# Patient Record
Sex: MALE | Race: BLACK OR AFRICAN AMERICAN | NOT HISPANIC OR LATINO
[De-identification: names, ages, dates, MRNs, and addresses within clinical notes are randomized per-mention and may not be internally consistent; named-entity substitution may affect disease eponyms.]

---

## 2020-01-30 PROBLEM — Z00.00 ENCOUNTER FOR PREVENTIVE HEALTH EXAMINATION: Status: ACTIVE | Noted: 2020-01-30

## 2020-02-01 ENCOUNTER — APPOINTMENT (OUTPATIENT)
Dept: NUCLEAR MEDICINE | Facility: IMAGING CENTER | Age: 80
End: 2020-02-01
Payer: MEDICARE

## 2020-02-01 ENCOUNTER — OUTPATIENT (OUTPATIENT)
Dept: OUTPATIENT SERVICES | Facility: HOSPITAL | Age: 80
LOS: 1 days | End: 2020-02-01
Payer: MEDICARE

## 2020-02-01 DIAGNOSIS — E83.52 HYPERCALCEMIA: ICD-10-CM

## 2020-02-01 PROCEDURE — A9500: CPT

## 2020-02-01 PROCEDURE — 78072 PARATHYRD PLANAR W/SPECT&CT: CPT

## 2020-02-01 PROCEDURE — 78072 PARATHYRD PLANAR W/SPECT&CT: CPT | Mod: 26

## 2020-02-01 PROCEDURE — A9512: CPT

## 2020-07-21 ENCOUNTER — APPOINTMENT (OUTPATIENT)
Dept: OTOLARYNGOLOGY | Facility: CLINIC | Age: 80
End: 2020-07-21

## 2020-08-18 ENCOUNTER — APPOINTMENT (OUTPATIENT)
Dept: OTOLARYNGOLOGY | Facility: CLINIC | Age: 80
End: 2020-08-18
Payer: MEDICARE

## 2020-08-18 VITALS — TEMPERATURE: 98.7 F | WEIGHT: 195 LBS | BODY MASS INDEX: 27.3 KG/M2 | HEIGHT: 71 IN

## 2020-08-18 VITALS — SYSTOLIC BLOOD PRESSURE: 126 MMHG | DIASTOLIC BLOOD PRESSURE: 76 MMHG | HEART RATE: 86 BPM

## 2020-08-18 DIAGNOSIS — Z72.89 OTHER PROBLEMS RELATED TO LIFESTYLE: ICD-10-CM

## 2020-08-18 DIAGNOSIS — E21.3 HYPERPARATHYROIDISM, UNSPECIFIED: ICD-10-CM

## 2020-08-18 DIAGNOSIS — Z86.79 PERSONAL HISTORY OF OTHER DISEASES OF THE CIRCULATORY SYSTEM: ICD-10-CM

## 2020-08-18 PROCEDURE — 99204 OFFICE O/P NEW MOD 45 MIN: CPT

## 2020-08-18 RX ORDER — METOPROLOL TARTRATE 75 MG/1
TABLET, FILM COATED ORAL
Refills: 0 | Status: ACTIVE | COMMUNITY

## 2020-08-18 RX ORDER — AMLODIPINE BESYLATE 5 MG/1
TABLET ORAL
Refills: 0 | Status: ACTIVE | COMMUNITY

## 2020-08-18 RX ORDER — NIFEDIPINE 20 MG/1
CAPSULE ORAL
Refills: 0 | Status: ACTIVE | COMMUNITY

## 2020-08-18 RX ORDER — LOSARTAN POTASSIUM 100 MG/1
TABLET, FILM COATED ORAL
Refills: 0 | Status: ACTIVE | COMMUNITY

## 2020-08-18 NOTE — HISTORY OF PRESENT ILLNESS
[None] : No associated symptoms are reported. [de-identified] : Mr. Tapia is a 80 yo male  who is here for eval for hyperparathyroidsm.  NAEEM Rosen (PCP) noted elevated calcium and referred to (Endocrinologist).Referred by ELIE Serrato. Pt reports possiblu knowing of this a year or two prior. \par 1/18/2020 serum calcium 10.8, PTHI 68\par 2/1/2020 NM Parathyroid Imaging showing single parathyroid lesion posterior to the upper pole of the right thyroid lobe\par 2/24/2020 24 hour urine with creatinine showing elevated UCREACAl 3.1 , UCACalc 368\par Denies any history of kidney stones. Complaints of back pain and some insomnia.  \par Feels good energy and memory.  Had repoted nl bone densiy couple years ago.  \par \par Pt reports good health otherwise. \par Denies pain, dysphagia, dysphonia and or dyspnea \par Denies recent cough, fever, chill, or changes in taste or smell

## 2020-08-18 NOTE — CONSULT LETTER
[Dear  ___] : Dear  [unfilled], [Please see my note below.] : Please see my note below. [Consult Letter:] : I had the pleasure of evaluating your patient, [unfilled]. [Sincerely,] : Sincerely, [Consult Closing:] : Thank you very much for allowing me to participate in the care of this patient.  If you have any questions, please do not hesitate to contact me. [FreeTextEntry3] : Regan Bee MD [FreeTextEntry2] : Cinthia Lam MD ([place], [place])

## 2020-08-28 ENCOUNTER — RESULT REVIEW (OUTPATIENT)
Age: 80
End: 2020-08-28

## 2020-08-28 ENCOUNTER — APPOINTMENT (OUTPATIENT)
Dept: ULTRASOUND IMAGING | Facility: CLINIC | Age: 80
End: 2020-08-28
Payer: MEDICARE

## 2020-08-28 ENCOUNTER — OUTPATIENT (OUTPATIENT)
Dept: OUTPATIENT SERVICES | Facility: HOSPITAL | Age: 80
LOS: 1 days | End: 2020-08-28
Payer: MEDICARE

## 2020-08-28 DIAGNOSIS — E21.3 HYPERPARATHYROIDISM, UNSPECIFIED: ICD-10-CM

## 2020-08-28 PROCEDURE — 76536 US EXAM OF HEAD AND NECK: CPT

## 2020-08-28 PROCEDURE — 76536 US EXAM OF HEAD AND NECK: CPT | Mod: 26

## 2020-11-06 ENCOUNTER — APPOINTMENT (OUTPATIENT)
Dept: OTOLARYNGOLOGY | Facility: HOSPITAL | Age: 80
End: 2020-11-06

## 2021-02-19 ENCOUNTER — INPATIENT (INPATIENT)
Facility: HOSPITAL | Age: 81
LOS: 1 days | Discharge: HOME | End: 2021-02-21
Attending: INTERNAL MEDICINE | Admitting: INTERNAL MEDICINE
Payer: MEDICARE

## 2021-02-19 VITALS
OXYGEN SATURATION: 98 % | RESPIRATION RATE: 18 BRPM | HEART RATE: 102 BPM | SYSTOLIC BLOOD PRESSURE: 149 MMHG | WEIGHT: 192.9 LBS | DIASTOLIC BLOOD PRESSURE: 84 MMHG | TEMPERATURE: 98 F | HEIGHT: 71 IN

## 2021-02-19 LAB
ALBUMIN SERPL ELPH-MCNC: 3.7 G/DL — SIGNIFICANT CHANGE UP (ref 3.3–5)
ALP SERPL-CCNC: 97 U/L — SIGNIFICANT CHANGE UP (ref 40–120)
ALT FLD-CCNC: 27 U/L — SIGNIFICANT CHANGE UP (ref 12–78)
ANION GAP SERPL CALC-SCNC: 8 MMOL/L — SIGNIFICANT CHANGE UP (ref 5–17)
AST SERPL-CCNC: 26 U/L — SIGNIFICANT CHANGE UP (ref 15–37)
BASOPHILS # BLD AUTO: 0.03 K/UL — SIGNIFICANT CHANGE UP (ref 0–0.2)
BASOPHILS NFR BLD AUTO: 0.7 % — SIGNIFICANT CHANGE UP (ref 0–2)
BILIRUB SERPL-MCNC: 0.5 MG/DL — SIGNIFICANT CHANGE UP (ref 0.2–1.2)
BUN SERPL-MCNC: 11 MG/DL — SIGNIFICANT CHANGE UP (ref 7–23)
CALCIUM SERPL-MCNC: 10.4 MG/DL — HIGH (ref 8.5–10.1)
CHLORIDE SERPL-SCNC: 108 MMOL/L — SIGNIFICANT CHANGE UP (ref 96–108)
CK SERPL-CCNC: 354 U/L — HIGH (ref 26–308)
CO2 SERPL-SCNC: 27 MMOL/L — SIGNIFICANT CHANGE UP (ref 22–31)
CREAT SERPL-MCNC: 1.06 MG/DL — SIGNIFICANT CHANGE UP (ref 0.5–1.3)
EOSINOPHIL # BLD AUTO: 0.07 K/UL — SIGNIFICANT CHANGE UP (ref 0–0.5)
EOSINOPHIL NFR BLD AUTO: 1.6 % — SIGNIFICANT CHANGE UP (ref 0–6)
GLUCOSE SERPL-MCNC: 136 MG/DL — HIGH (ref 70–99)
HCT VFR BLD CALC: 40.5 % — SIGNIFICANT CHANGE UP (ref 39–50)
HGB BLD-MCNC: 12.7 G/DL — LOW (ref 13–17)
IMM GRANULOCYTES NFR BLD AUTO: 0.2 % — SIGNIFICANT CHANGE UP (ref 0–1.5)
LYMPHOCYTES # BLD AUTO: 1.58 K/UL — SIGNIFICANT CHANGE UP (ref 1–3.3)
LYMPHOCYTES # BLD AUTO: 36 % — SIGNIFICANT CHANGE UP (ref 13–44)
MAGNESIUM SERPL-MCNC: 2 MG/DL — SIGNIFICANT CHANGE UP (ref 1.6–2.6)
MCHC RBC-ENTMCNC: 27.7 PG — SIGNIFICANT CHANGE UP (ref 27–34)
MCHC RBC-ENTMCNC: 31.4 GM/DL — LOW (ref 32–36)
MCV RBC AUTO: 88.2 FL — SIGNIFICANT CHANGE UP (ref 80–100)
MONOCYTES # BLD AUTO: 0.45 K/UL — SIGNIFICANT CHANGE UP (ref 0–0.9)
MONOCYTES NFR BLD AUTO: 10.3 % — SIGNIFICANT CHANGE UP (ref 2–14)
NEUTROPHILS # BLD AUTO: 2.25 K/UL — SIGNIFICANT CHANGE UP (ref 1.8–7.4)
NEUTROPHILS NFR BLD AUTO: 51.2 % — SIGNIFICANT CHANGE UP (ref 43–77)
NRBC # BLD: 0 /100 WBCS — SIGNIFICANT CHANGE UP (ref 0–0)
PHOSPHATE SERPL-MCNC: 2.5 MG/DL — SIGNIFICANT CHANGE UP (ref 2.5–4.5)
PLATELET # BLD AUTO: 201 K/UL — SIGNIFICANT CHANGE UP (ref 150–400)
POTASSIUM SERPL-MCNC: 3.7 MMOL/L — SIGNIFICANT CHANGE UP (ref 3.5–5.3)
POTASSIUM SERPL-SCNC: 3.7 MMOL/L — SIGNIFICANT CHANGE UP (ref 3.5–5.3)
PROT SERPL-MCNC: 7.9 GM/DL — SIGNIFICANT CHANGE UP (ref 6–8.3)
RBC # BLD: 4.59 M/UL — SIGNIFICANT CHANGE UP (ref 4.2–5.8)
RBC # FLD: 15.1 % — HIGH (ref 10.3–14.5)
SODIUM SERPL-SCNC: 143 MMOL/L — SIGNIFICANT CHANGE UP (ref 135–145)
TROPONIN I SERPL-MCNC: 0.12 NG/ML — HIGH (ref 0.01–0.04)
WBC # BLD: 4.39 K/UL — SIGNIFICANT CHANGE UP (ref 3.8–10.5)
WBC # FLD AUTO: 4.39 K/UL — SIGNIFICANT CHANGE UP (ref 3.8–10.5)

## 2021-02-19 PROCEDURE — 71046 X-RAY EXAM CHEST 2 VIEWS: CPT | Mod: 26

## 2021-02-19 PROCEDURE — 93010 ELECTROCARDIOGRAM REPORT: CPT

## 2021-02-19 PROCEDURE — 99285 EMERGENCY DEPT VISIT HI MDM: CPT

## 2021-02-19 RX ORDER — ASPIRIN/CALCIUM CARB/MAGNESIUM 324 MG
325 TABLET ORAL ONCE
Refills: 0 | Status: COMPLETED | OUTPATIENT
Start: 2021-02-19 | End: 2021-02-19

## 2021-02-19 RX ADMIN — Medication 325 MILLIGRAM(S): at 23:18

## 2021-02-19 NOTE — ED ADULT NURSE NOTE - ED STAT RN HANDOFF DETAILS
Report endorsed to oncoming hold RN. Safety checks compld this shift/Safety rounds completed hourly.  IV sites checked Q2+remains WDL. Meds given as ord with no s/s of adverse RXNs. Fall +skin precs in place. Any issues endorsed to oncoming RN for follow up. Report endorsed to oncoming RN for my break coverage. Report given to covering RN and patient informed during rounding Safety checks compld this shift/Safety rounds completed hourly.  Fall +skin precs in place. Any issues endorsed to oncoming RN for follow up. RN Assumed patient's care for coverage.

## 2021-02-19 NOTE — ED ADULT NURSE REASSESSMENT NOTE - NS ED NURSE REASSESS COMMENT FT1
Pt resting comfortably at this time. No s/s of pain noted. Cardiac monitor in place. Awaiting to be seen

## 2021-02-19 NOTE — ED ADULT NURSE NOTE - OBJECTIVE STATEMENT
Received patient in bed 12. AOX4. Witnessed ambulating with steady gait. AOX4. c/o chills since yesterday seen at pmd today sent to er for abnormal ekg denies chest pain. Cardiac monitor completed. EKG and labs. Awaiting to be seen

## 2021-02-19 NOTE — ED ADULT NURSE NOTE - NEURO BEHAVIOR
Principal Discharge DX:	Abnormal EEG  Assessment and plan of treatment:	- admit to neurology  - labs
calm

## 2021-02-19 NOTE — ED ADULT NURSE NOTE - NSIMPLEMENTINTERV_GEN_ALL_ED
Implemented All Fall with Harm Risk Interventions:  [place] to call system. Call bell, personal items and telephone within reach. Instruct patient to call for assistance. Room bathroom lighting operational. Non-slip footwear when patient is off stretcher. Physically safe environment: no spills, clutter or unnecessary equipment. Stretcher in lowest position, wheels locked, appropriate side rails in place. Provide visual cue, wrist band, yellow gown, etc. Monitor gait and stability. Monitor for mental status changes and reorient to person, place, and time. Review medications for side effects contributing to fall risk. Reinforce activity limits and safety measures with patient and family. Provide visual clues: red socks.

## 2021-02-19 NOTE — ED PROVIDER NOTE - OBJECTIVE STATEMENT
This patient is a 80 year old man who presents to the ER with reports of chills.  Patient states that he has been feeling shaky and having chills since yesterday.  He went to his PMD today and was told to come to the ER.  Currently in the ER he denies the symptoms.  He also denies chest pain, SOB, fever, abdominal pain and vomiting.

## 2021-02-19 NOTE — ED PROVIDER NOTE - CLINICAL SUMMARY MEDICAL DECISION MAKING FREE TEXT BOX
Patient with reports of shaking chills now resolved.  Patient well appearing no acute distress.  No tachycardia at this time no fever.  CXR negative for PNA.  EKG non-ischemic.  Troponin noted to be elevated.   Lovenox given and patient admitted to hospital/telemetry for NSTEMI.

## 2021-02-20 DIAGNOSIS — E83.52 HYPERCALCEMIA: ICD-10-CM

## 2021-02-20 DIAGNOSIS — I10 ESSENTIAL (PRIMARY) HYPERTENSION: ICD-10-CM

## 2021-02-20 DIAGNOSIS — Z98.890 OTHER SPECIFIED POSTPROCEDURAL STATES: Chronic | ICD-10-CM

## 2021-02-20 DIAGNOSIS — E78.5 HYPERLIPIDEMIA, UNSPECIFIED: ICD-10-CM

## 2021-02-20 DIAGNOSIS — Z29.9 ENCOUNTER FOR PROPHYLACTIC MEASURES, UNSPECIFIED: ICD-10-CM

## 2021-02-20 DIAGNOSIS — I21.4 NON-ST ELEVATION (NSTEMI) MYOCARDIAL INFARCTION: ICD-10-CM

## 2021-02-20 LAB
ALBUMIN SERPL ELPH-MCNC: 3.2 G/DL — LOW (ref 3.3–5)
ALP SERPL-CCNC: 86 U/L — SIGNIFICANT CHANGE UP (ref 40–120)
ALT FLD-CCNC: 26 U/L — SIGNIFICANT CHANGE UP (ref 12–78)
ANION GAP SERPL CALC-SCNC: 5 MMOL/L — SIGNIFICANT CHANGE UP (ref 5–17)
AST SERPL-CCNC: 26 U/L — SIGNIFICANT CHANGE UP (ref 15–37)
BASOPHILS # BLD AUTO: 0.03 K/UL — SIGNIFICANT CHANGE UP (ref 0–0.2)
BASOPHILS NFR BLD AUTO: 0.6 % — SIGNIFICANT CHANGE UP (ref 0–2)
BILIRUB SERPL-MCNC: 0.5 MG/DL — SIGNIFICANT CHANGE UP (ref 0.2–1.2)
BUN SERPL-MCNC: 11 MG/DL — SIGNIFICANT CHANGE UP (ref 7–23)
CALCIUM SERPL-MCNC: 9.8 MG/DL — SIGNIFICANT CHANGE UP (ref 8.5–10.1)
CHLORIDE SERPL-SCNC: 109 MMOL/L — HIGH (ref 96–108)
CHOLEST SERPL-MCNC: 123 MG/DL — SIGNIFICANT CHANGE UP
CK MB BLD-MCNC: 1.1 % — SIGNIFICANT CHANGE UP (ref 0–3.5)
CK MB CFR SERPL CALC: 3.4 NG/ML — SIGNIFICANT CHANGE UP (ref 0.5–3.6)
CK SERPL-CCNC: 321 U/L — HIGH (ref 26–308)
CO2 SERPL-SCNC: 29 MMOL/L — SIGNIFICANT CHANGE UP (ref 22–31)
CREAT SERPL-MCNC: 0.92 MG/DL — SIGNIFICANT CHANGE UP (ref 0.5–1.3)
EOSINOPHIL # BLD AUTO: 0.13 K/UL — SIGNIFICANT CHANGE UP (ref 0–0.5)
EOSINOPHIL NFR BLD AUTO: 2.7 % — SIGNIFICANT CHANGE UP (ref 0–6)
FLUAV AG NPH QL: SIGNIFICANT CHANGE UP
FLUBV AG NPH QL: SIGNIFICANT CHANGE UP
GLUCOSE SERPL-MCNC: 90 MG/DL — SIGNIFICANT CHANGE UP (ref 70–99)
HCT VFR BLD CALC: 37.5 % — LOW (ref 39–50)
HDLC SERPL-MCNC: 54 MG/DL — SIGNIFICANT CHANGE UP
HGB BLD-MCNC: 12 G/DL — LOW (ref 13–17)
IMM GRANULOCYTES NFR BLD AUTO: 0.2 % — SIGNIFICANT CHANGE UP (ref 0–1.5)
LIPID PNL WITH DIRECT LDL SERPL: 54 MG/DL — SIGNIFICANT CHANGE UP
LYMPHOCYTES # BLD AUTO: 2.59 K/UL — SIGNIFICANT CHANGE UP (ref 1–3.3)
LYMPHOCYTES # BLD AUTO: 53.8 % — HIGH (ref 13–44)
MAGNESIUM SERPL-MCNC: 2 MG/DL — SIGNIFICANT CHANGE UP (ref 1.6–2.6)
MCHC RBC-ENTMCNC: 27.7 PG — SIGNIFICANT CHANGE UP (ref 27–34)
MCHC RBC-ENTMCNC: 32 GM/DL — SIGNIFICANT CHANGE UP (ref 32–36)
MCV RBC AUTO: 86.6 FL — SIGNIFICANT CHANGE UP (ref 80–100)
MONOCYTES # BLD AUTO: 0.57 K/UL — SIGNIFICANT CHANGE UP (ref 0–0.9)
MONOCYTES NFR BLD AUTO: 11.9 % — SIGNIFICANT CHANGE UP (ref 2–14)
NEUTROPHILS # BLD AUTO: 1.48 K/UL — LOW (ref 1.8–7.4)
NEUTROPHILS NFR BLD AUTO: 30.8 % — LOW (ref 43–77)
NON HDL CHOLESTEROL: 68 MG/DL — SIGNIFICANT CHANGE UP
NRBC # BLD: 0 /100 WBCS — SIGNIFICANT CHANGE UP (ref 0–0)
PHOSPHATE SERPL-MCNC: 2.9 MG/DL — SIGNIFICANT CHANGE UP (ref 2.5–4.5)
PLATELET # BLD AUTO: 182 K/UL — SIGNIFICANT CHANGE UP (ref 150–400)
POTASSIUM SERPL-MCNC: 4 MMOL/L — SIGNIFICANT CHANGE UP (ref 3.5–5.3)
POTASSIUM SERPL-SCNC: 4 MMOL/L — SIGNIFICANT CHANGE UP (ref 3.5–5.3)
PROT SERPL-MCNC: 7 GM/DL — SIGNIFICANT CHANGE UP (ref 6–8.3)
RBC # BLD: 4.33 M/UL — SIGNIFICANT CHANGE UP (ref 4.2–5.8)
RBC # FLD: 15.1 % — HIGH (ref 10.3–14.5)
SARS-COV-2 IGG SERPL QL IA: NEGATIVE — SIGNIFICANT CHANGE UP
SARS-COV-2 IGM SERPL IA-ACNC: 0.07 INDEX — SIGNIFICANT CHANGE UP
SARS-COV-2 RNA SPEC QL NAA+PROBE: SIGNIFICANT CHANGE UP
SODIUM SERPL-SCNC: 143 MMOL/L — SIGNIFICANT CHANGE UP (ref 135–145)
TRIGL SERPL-MCNC: 75 MG/DL — SIGNIFICANT CHANGE UP
TROPONIN I SERPL-MCNC: 0.12 NG/ML — HIGH (ref 0.01–0.04)
TROPONIN I SERPL-MCNC: 0.13 NG/ML — HIGH (ref 0.01–0.04)
WBC # BLD: 4.81 K/UL — SIGNIFICANT CHANGE UP (ref 3.8–10.5)
WBC # FLD AUTO: 4.81 K/UL — SIGNIFICANT CHANGE UP (ref 3.8–10.5)

## 2021-02-20 PROCEDURE — 12345: CPT | Mod: NC

## 2021-02-20 PROCEDURE — 99223 1ST HOSP IP/OBS HIGH 75: CPT

## 2021-02-20 PROCEDURE — 93306 TTE W/DOPPLER COMPLETE: CPT | Mod: 26

## 2021-02-20 PROCEDURE — 99222 1ST HOSP IP/OBS MODERATE 55: CPT

## 2021-02-20 RX ORDER — NIFEDIPINE 30 MG
30 TABLET, EXTENDED RELEASE 24 HR ORAL DAILY
Refills: 0 | Status: DISCONTINUED | OUTPATIENT
Start: 2021-02-20 | End: 2021-02-21

## 2021-02-20 RX ORDER — SODIUM CHLORIDE 9 MG/ML
1000 INJECTION INTRAMUSCULAR; INTRAVENOUS; SUBCUTANEOUS
Refills: 0 | Status: COMPLETED | OUTPATIENT
Start: 2021-02-20 | End: 2021-02-20

## 2021-02-20 RX ORDER — ONDANSETRON 8 MG/1
4 TABLET, FILM COATED ORAL EVERY 6 HOURS
Refills: 0 | Status: DISCONTINUED | OUTPATIENT
Start: 2021-02-20 | End: 2021-02-21

## 2021-02-20 RX ORDER — NIFEDIPINE 30 MG
0 TABLET, EXTENDED RELEASE 24 HR ORAL
Qty: 0 | Refills: 0 | DISCHARGE

## 2021-02-20 RX ORDER — LOSARTAN POTASSIUM 100 MG/1
0 TABLET, FILM COATED ORAL
Qty: 0 | Refills: 0 | DISCHARGE

## 2021-02-20 RX ORDER — ASPIRIN/CALCIUM CARB/MAGNESIUM 324 MG
81 TABLET ORAL DAILY
Refills: 0 | Status: DISCONTINUED | OUTPATIENT
Start: 2021-02-20 | End: 2021-02-21

## 2021-02-20 RX ORDER — ACETAMINOPHEN 500 MG
650 TABLET ORAL EVERY 6 HOURS
Refills: 0 | Status: DISCONTINUED | OUTPATIENT
Start: 2021-02-20 | End: 2021-02-21

## 2021-02-20 RX ORDER — ENOXAPARIN SODIUM 100 MG/ML
80 INJECTION SUBCUTANEOUS ONCE
Refills: 0 | Status: COMPLETED | OUTPATIENT
Start: 2021-02-20 | End: 2021-02-20

## 2021-02-20 RX ORDER — LOVASTATIN 20 MG
0 TABLET ORAL
Qty: 0 | Refills: 0 | DISCHARGE

## 2021-02-20 RX ORDER — ATORVASTATIN CALCIUM 80 MG/1
80 TABLET, FILM COATED ORAL AT BEDTIME
Refills: 0 | Status: DISCONTINUED | OUTPATIENT
Start: 2021-02-20 | End: 2021-02-21

## 2021-02-20 RX ORDER — LOSARTAN POTASSIUM 100 MG/1
25 TABLET, FILM COATED ORAL DAILY
Refills: 0 | Status: DISCONTINUED | OUTPATIENT
Start: 2021-02-20 | End: 2021-02-21

## 2021-02-20 RX ORDER — METOPROLOL TARTRATE 50 MG
0 TABLET ORAL
Qty: 0 | Refills: 0 | DISCHARGE

## 2021-02-20 RX ORDER — ENOXAPARIN SODIUM 100 MG/ML
80 INJECTION SUBCUTANEOUS EVERY 12 HOURS
Refills: 0 | Status: DISCONTINUED | OUTPATIENT
Start: 2021-02-20 | End: 2021-02-21

## 2021-02-20 RX ADMIN — Medication 81 MILLIGRAM(S): at 11:45

## 2021-02-20 RX ADMIN — Medication 30 MILLIGRAM(S): at 04:13

## 2021-02-20 RX ADMIN — ATORVASTATIN CALCIUM 80 MILLIGRAM(S): 80 TABLET, FILM COATED ORAL at 22:02

## 2021-02-20 RX ADMIN — ENOXAPARIN SODIUM 80 MILLIGRAM(S): 100 INJECTION SUBCUTANEOUS at 00:51

## 2021-02-20 RX ADMIN — ENOXAPARIN SODIUM 80 MILLIGRAM(S): 100 INJECTION SUBCUTANEOUS at 17:10

## 2021-02-20 RX ADMIN — LOSARTAN POTASSIUM 25 MILLIGRAM(S): 100 TABLET, FILM COATED ORAL at 04:13

## 2021-02-20 RX ADMIN — SODIUM CHLORIDE 75 MILLILITER(S): 9 INJECTION INTRAMUSCULAR; INTRAVENOUS; SUBCUTANEOUS at 07:01

## 2021-02-20 NOTE — H&P ADULT - PROBLEM SELECTOR PLAN 2
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 Pt reports surgery was initially recommended when first diagnosed in Feb of last yr however procedure was delayed due to COVID. Pt reports recent follow up lab work showed Ca levels are decreasing on own and PCP stated he may not need surgery  - mildly elevated today, will place on gentle IVF o/n, f/u rpt in am  - Outpt f/u w/ PCP/Endocrinology
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
## 2021-02-20 NOTE — H&P ADULT - NSICDXFAMILYHX_GEN_ALL_CORE_FT
FAMILY HISTORY:  Family history unknown, Both father and sister passed away in [place], pt unsure why

## 2021-02-20 NOTE — H&P ADULT - PROBLEM SELECTOR PLAN 1
- c/w tele monitoring  - trend trops  - given chills, could possibly be type II related some underlying/unknown viral illness  - pt s/p 1 full dose of ASA and Lovenox. Cont daily ASA, A/C pending Cardio eval in am (will call service in am)  - keep NPO for now  - start high dose statin  - f/u lipid panel in am  - 2D Echo in am  - Pt believes he may have had a recent NM stress test w/in the year which was normal; states info w/ PCP Dr James ([facility]) consider calling in am to get records - c/w tele monitoring  - trend trops  - given chills, could possibly be type II related to some underlying/unknown viral illness  - pt s/p 1 full dose of ASA and Lovenox. Cont daily ASA, A/C pending Cardio eval in am; service made aware, Dr John on call  - keep NPO for now  - start high dose statin  - f/u lipid panel in am  - 2D Echo in am  - Pt believes he may have had a recent NM stress test w/in the year which was normal; states info w/ PCP Dr James ([facility]) consider calling in am to get records
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
## 2021-02-20 NOTE — H&P ADULT - NSHPPHYSICALEXAM_GEN_ALL_CORE
PHYSICAL EXAM:    Vital Signs Last 24 Hrs  T(C): 36.8 (19 Feb 2021 23:15), Max: 36.8 (19 Feb 2021 20:33)  T(F): 98.2 (19 Feb 2021 23:15), Max: 98.3 (19 Feb 2021 20:33)  HR: 70 (20 Feb 2021 00:00) (70 - 102)  BP: 117/73 (20 Feb 2021 00:00) (104/74 - 149/84)  BP(mean): --  RR: 19 (20 Feb 2021 00:00) (14 - 19)  SpO2: 97% (20 Feb 2021 00:00) (94% - 98%)    GENERAL: Pt lying in bed comfortably in NAD  HEENT:  Atraumatic, EOMI, PERRL, conjunctiva and sclera clear, MMM  NECK: Supple, No JVD  CHEST/LUNG: Clear to auscultation bilaterally; No rales, rhonchi, wheezing or rubs. Unlabored respirations  HEART: Regular rate and rhythm; No murmurs, rubs, or gallops  ABDOMEN: Bowel sounds present; Soft, Nontender, Nondistended. No guarding or rigidity    EXTREMITIES:  2+ Peripheral Pulses, brisk capillary refill. No clubbing, cyanosis, or edema  NEUROLOGICAL:  Alert & Oriented X3, speech clear. Answers questions appropriately. Full and equal strength B/L upper and lower extremities. No deficits   MSK: FROM x 4 extremities   SKIN: No rashes or lesions

## 2021-02-20 NOTE — H&P ADULT - NSHPLABSRESULTS_GEN_ALL_CORE
T(C): 36.8 (02-19-21 @ 23:15), Max: 36.8 (02-19-21 @ 20:33)  HR: 70 (02-20-21 @ 00:00) (70 - 102)  BP: 117/73 (02-20-21 @ 00:00) (104/74 - 149/84)  RR: 19 (02-20-21 @ 00:00) (14 - 19)  SpO2: 97% (02-20-21 @ 00:00) (94% - 98%)                        12.7   4.39  )-----------( 201      ( 19 Feb 2021 22:00 )             40.5     02-19    143  |  108  |  11  ----------------------------<  136<H>  3.7   |  27  |  1.06    Ca    10.4<H>      19 Feb 2021 22:00  Phos  2.5     02-19  Mg     2.0     02-19    TPro  7.9  /  Alb  3.7  /  TBili  0.5  /  DBili  x   /  AST  26  /  ALT  27  /  AlkPhos  97  02-19    LIVER FUNCTIONS - ( 19 Feb 2021 22:00 )  Alb: 3.7 g/dL / Pro: 7.9 gm/dL / ALK PHOS: 97 U/L / ALT: 27 U/L / AST: 26 U/L / GGT: x                   acetaminophen   Tablet .. 650 milliGRAM(s) Oral every 6 hours PRN  ondansetron Injectable 4 milliGRAM(s) IV Push every 6 hours PRN T(C): 36.8 (02-19-21 @ 23:15), Max: 36.8 (02-19-21 @ 20:33)  HR: 70 (02-20-21 @ 00:00) (70 - 102)  BP: 117/73 (02-20-21 @ 00:00) (104/74 - 149/84)  RR: 19 (02-20-21 @ 00:00) (14 - 19)  SpO2: 97% (02-20-21 @ 00:00) (94% - 98%)                        12.7   4.39  )-----------( 201      ( 19 Feb 2021 22:00 )             40.5     02-19    143  |  108  |  11  ----------------------------<  136<H>  3.7   |  27  |  1.06    Ca    10.4<H>      19 Feb 2021 22:00  Phos  2.5     02-19  Mg     2.0     02-19    TPro  7.9  /  Alb  3.7  /  TBili  0.5  /  DBili  x   /  AST  26  /  ALT  27  /  AlkPhos  97  02-19    LIVER FUNCTIONS - ( 19 Feb 2021 22:00 )  Alb: 3.7 g/dL / Pro: 7.9 gm/dL / ALK PHOS: 97 U/L / ALT: 27 U/L / AST: 26 U/L / GGT: x             EKG - NSR at 92 bpm, T wave inv in aVL, nonspecific flattening in lateral leads      acetaminophen   Tablet .. 650 milliGRAM(s) Oral every 6 hours PRN  ondansetron Injectable 4 milliGRAM(s) IV Push every 6 hours PRN

## 2021-02-20 NOTE — CONSULT NOTE ADULT - ASSESSMENT
The chart has been reviewed but the patient has not yet been examined.  Full note to follow. 79 yo male admitted with shaking chills and tachycardia.  Mildly elevated troponins, unchanged over course of 24 hours, and non specific ST T wave changes on ecg; unlikely due to acute coronary insufficiency.  He is otherwise afebrile and no leukocytosis noted.  If asymptomatic, can discharge in AM from CV perspective for out patient risk stratification.

## 2021-02-20 NOTE — H&P ADULT - ASSESSMENT
81 y/o male w/ PMHx of HTN, HLD, Chronic Lower back pain secondary to Scoliosis sent in from PCP's office for chills, tachycardia r/o sepsis. Pt being admitted for NSTEMI.    IMPROVE VTE Individual Risk Assessment    RISK                                                                Points    [  ] Previous VTE                                                  3    [  ] Thrombophilia                                               2    [  ] Lower limb paralysis                                      2        (unable to hold up >15 seconds)      [  ] Current Cancer                                              2         (within 6 months)    [  ] Immobilization > 24 hrs                                1    [  ] ICU/CCU stay > 24 hours                              1    [  ] Age > 60                                                      1    IMPROVE VTE Score _________    IMPROVE Score 0-1: Low Risk, No VTE prophylaxis required for most patients, encourage ambulation.   IMPROVE Score 2-3: At risk, pharmacologic VTE prophylaxis is indicated for most patients (in the absence of a contraindication)  IMPROVE Score > or = 4: High Risk, pharmacologic VTE prophylaxis is indicated for most patients (in the absence of a contraindication) 81 y/o male w/ PMHx of HTN, HLD, Chronic Lower back pain secondary to Scoliosis, Hypercalcemia secondary to Parathyroid Adenoma sent in from PCP's office for chills, tachycardia r/o sepsis. Pt being admitted for NSTEMI.    IMPROVE VTE Individual Risk Assessment    RISK                                                                Points    [  ] Previous VTE                                                  3    [  ] Thrombophilia                                               2    [  ] Lower limb paralysis                                      2        (unable to hold up >15 seconds)      [  ] Current Cancer                                              2         (within 6 months)    [  ] Immobilization > 24 hrs                                1    [  ] ICU/CCU stay > 24 hours                              1    [  ] Age > 60                                                      1    IMPROVE VTE Score _________    IMPROVE Score 0-1: Low Risk, No VTE prophylaxis required for most patients, encourage ambulation.   IMPROVE Score 2-3: At risk, pharmacologic VTE prophylaxis is indicated for most patients (in the absence of a contraindication)  IMPROVE Score > or = 4: High Risk, pharmacologic VTE prophylaxis is indicated for most patients (in the absence of a contraindication)
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
## 2021-02-21 ENCOUNTER — TRANSCRIPTION ENCOUNTER (OUTPATIENT)
Age: 81
End: 2021-02-21

## 2021-02-21 VITALS
TEMPERATURE: 98 F | RESPIRATION RATE: 18 BRPM | DIASTOLIC BLOOD PRESSURE: 82 MMHG | OXYGEN SATURATION: 97 % | HEART RATE: 62 BPM | SYSTOLIC BLOOD PRESSURE: 134 MMHG

## 2021-02-21 PROCEDURE — 99239 HOSP IP/OBS DSCHRG MGMT >30: CPT

## 2021-02-21 PROCEDURE — 99233 SBSQ HOSP IP/OBS HIGH 50: CPT

## 2021-02-21 RX ADMIN — Medication 30 MILLIGRAM(S): at 11:24

## 2021-02-21 RX ADMIN — ENOXAPARIN SODIUM 80 MILLIGRAM(S): 100 INJECTION SUBCUTANEOUS at 05:57

## 2021-02-21 RX ADMIN — Medication 81 MILLIGRAM(S): at 11:23

## 2021-02-21 RX ADMIN — LOSARTAN POTASSIUM 25 MILLIGRAM(S): 100 TABLET, FILM COATED ORAL at 05:57

## 2021-02-21 NOTE — DISCHARGE NOTE PROVIDER - NSDCMRMEDTOKEN_GEN_ALL_CORE_FT
LOSARTAN POTASSIUM 25 MG TAB: TAKE 1 TABLET (25 MG TOTAL) BY MOUTH DAILY MAX DAILY AMOUNT  25 MG  LOVASTATIN 10 MG TABLET: TAKE 1 TABLET BY MOUTH EVERY DAY IN THE EVENING  NIFEDIPINE ER 30 MG TABLET: TAKE 1 TABLET (30 MG TOTAL) BY MOUTH DAILY MAX DAILY AMOUNT  30 MG
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
## 2021-02-21 NOTE — DISCHARGE NOTE PROVIDER - CARE PROVIDER_API CALL
Kg John)  Cardiology; Cardiovascular Disease; Nuclear Cardiology  [address] [place] [address]  Phone: [phone]  Fax: [phone]  Follow Up Time:     pcp,   Phone: (   )    -  Fax: (   )    -  Follow Up Time:

## 2021-02-21 NOTE — DISCHARGE NOTE PROVIDER - NSDCCPCAREPLAN_GEN_ALL_CORE_FT
PRINCIPAL DISCHARGE DIAGNOSIS  Diagnosis: NSTEMI (non-ST elevated myocardial infarction)  Assessment and Plan of Treatment: ruled out  Chest pain Resolved  - Unlikely ACS  - D echo :   1. Left ventricular ejection fraction, by visual estimation, is 50 to 55%.   2. Trace mitral valve regurgitation.   3. Mild tricuspid regurgitation.   4. Estimated pulmonary artery systolic pressure is 36.9 mmHg assuming a right atrial pressure of 8 mmHg, which is consistent with borderline pulmonary hypertension.  Seen by cardio  Stable for DC   Outpt stress test
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
## 2021-02-21 NOTE — DISCHARGE NOTE NURSING/CASE MANAGEMENT/SOCIAL WORK - PATIENT PORTAL LINK FT
You can access the FollowMyHealth Patient Portal offered by [facility] by registering at the following website: [email or web address]. By joining [facility]’s FollowMyHealth portal, you will also be able to view your health information using other applications (apps) compatible with our system.

## 2021-02-21 NOTE — PROGRESS NOTE ADULT - ASSESSMENT
79 yo male admitted with shaking chills and tachycardia. Both resolved.  Mildly elevated troponins, unchanged over course of 24 hours, and non specific ST T wave changes on ecg; unlikely due to acute coronary insufficiency.  He is otherwise afebrile and no leukocytosis noted.  Can discharge in AM from [facility] perspective for out patient risk stratification @PCP.

## 2021-02-26 DIAGNOSIS — R07.9 CHEST PAIN, UNSPECIFIED: ICD-10-CM

## 2021-02-26 DIAGNOSIS — I08.1 RHEUMATIC DISORDERS OF BOTH MITRAL AND TRICUSPID VALVES: ICD-10-CM

## 2021-02-26 DIAGNOSIS — R68.83 CHILLS (WITHOUT FEVER): ICD-10-CM

## 2021-02-26 DIAGNOSIS — M41.9 SCOLIOSIS, UNSPECIFIED: ICD-10-CM

## 2021-02-26 DIAGNOSIS — E83.52 HYPERCALCEMIA: ICD-10-CM

## 2021-02-26 DIAGNOSIS — R00.0 TACHYCARDIA, UNSPECIFIED: ICD-10-CM

## 2021-02-26 DIAGNOSIS — I27.20 PULMONARY HYPERTENSION, UNSPECIFIED: ICD-10-CM

## 2021-02-26 DIAGNOSIS — D34 BENIGN NEOPLASM OF THYROID GLAND: ICD-10-CM

## 2021-02-26 DIAGNOSIS — I21.4 NON-ST ELEVATION (NSTEMI) MYOCARDIAL INFARCTION: ICD-10-CM

## 2022-03-27 PROBLEM — I10 ESSENTIAL (PRIMARY) HYPERTENSION: Chronic | Status: ACTIVE | Noted: 2021-02-20

## 2022-03-27 PROBLEM — E78.5 HYPERLIPIDEMIA, UNSPECIFIED: Chronic | Status: ACTIVE | Noted: 2021-02-20

## 2022-04-07 ENCOUNTER — APPOINTMENT (OUTPATIENT)
Dept: ORTHOPEDIC SURGERY | Facility: CLINIC | Age: 82
End: 2022-04-07
Payer: MEDICARE

## 2022-04-07 VITALS — HEIGHT: 71 IN | BODY MASS INDEX: 27.3 KG/M2 | WEIGHT: 195 LBS

## 2022-04-07 PROCEDURE — 99212 OFFICE O/P EST SF 10 MIN: CPT | Mod: 25

## 2022-04-07 NOTE — HISTORY OF PRESENT ILLNESS
[Left Leg] : left leg [Right Leg] : right leg [] : This patient has had an injection before: yes [4] : 4 [Orthovisc] : Orthovisc [de-identified] : Low back, L shoulder, B/L knee pain\par   3/3/22: MRI review. Shoulder with slight improvement.\par   3/17/22: Patient here today for orthovisc #1 both knees\par  3/24/22: Orthovisc #2\par  3/31/22: Orthovisc #3 bilateral knees.\par 4/7/22: Orthovisc #4 bilateral knees. [FreeTextEntry1] : Knees [de-identified] : 03.31.22  knees [TWNoteComboBox1] : 50%

## 2022-04-07 NOTE — DISCUSSION/SUMMARY
[de-identified] : The risks, benefits, contents and alternatives to injection were explained in full to the patient.  Risks outlined include but are not limited to infection, sepsis, bleeding, scarring, skin discoloration, temporary increase in pain, syncopal episode, failure to resolve symptoms, allergic reaction, flare reaction, permanent white skin discoloration, symptom recurrence, and elevation of blood sugar in diabetics.  Patient understood the risks.  All questions were answered.  After discussion of options, patient requested an injection.  Oral informed consent was obtained and sterile prep was done of the injection site.  Sterile technique was used to introduce the mixture.  Patient tolerated the procedure well.  Patient advised to ice the injection site this evening.  Signs and symptoms of infection reviewed and patient advised to call immediately for redness, fevers, and/or chills.\par \par Progress note completed by Suzi Barreto PA-C.

## 2022-04-07 NOTE — ASSESSMENT
[FreeTextEntry1] : FINDINGS/IMPRESSION: L-SPINE XRAY LHR\par There is right lateral listhesis of L4 on L5 by 2.0 cm, unchanged on the neutral view and right bending view. \par Please see official report of the scoliosis series of 2/7/22 for additional findings. \par JOSH REDMOND CHI, MD - Electronically Signed: 02-\par I REVIEWED AND AGREE WITH THE RESULTS. \par \par IMPRESSION: SCOLIOSIS STUDY. \par 1. Levoscoliosis of the thoracic spine measures 23 degrees and dextroscoliosis of the lumbar spine 43 degrees.\par 2. Transitional lumbosacral vertebra partially lumbarized S1.\par 3. Compression fracture deformity of L5 and S1. \par 4. Multilevel disc space narrowing of the lumbar spine. \par Miguel A Amaral MD - Electronically Signed: 02-\par I REVIEWED AND AGREE WITH THE RESULTS\par \par 2/17/22 xray left shoulder reveals AC joint arthritic changes. . \par Xray of the right knee reveals mod/adv tricompartment OA\par xray of the left knee reveals mod/adv tricompartment OA \par Obtain mri LEFT SHOULDER r/o RCT. \par \par Obtain auth for gel injection for the treatment of OA B/L knees. \par Referred to spine specialist for further care and treatment.\par \par Impression: left shoulder\par 1. 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 Lateral downsloping acromion which may be causing impingement.\par 2. High-grade partial tear at the insertion and critical zone with severe tendinopathy and fraying of the supraspinatus tendon. Low-grade partial tear at the insertion with severe tendinopathy and fraying of the infraspinatus tendon.\par 3. Longitudinal interstitial tear, tendinopathy, and tenosynovitis of the long head of the biceps tendon. The intra-articular portion is not well-visualized, likely torn.\par 4. Thickening/tendinopathy of the subscapularis tendon.\par 5. Tear of the posterior, superior, and anterior labrum.\par 6. Small joint effusion.\par E-signed by David Kramer M.D. on 02/26/2022\par I reviewed and agree with the results.\par \par Pathology discussed, wear and tear type changes. not indicated for surgical intervention. MRI reveals global inflammation with partial tear, and AC joint OA. \par He reports mild improvement in the shoulder, still having back and knee pain, \par Gel injections still pending, and intends to see Dr. Felix in the following days\par \par 3/31/22: He tolerated the injections well. He is advised rest and ice today. Activities as tolerated tomorrow. f/u in 1 week to continue series.\par \par 4/7/22: Orthovisc #4 B/L knees today, tolerated well

## 2022-04-07 NOTE — PROCEDURE
[Large Joint Injection] : Large joint injection [Bilateral] : bilaterally of the [Knee] : knee [Pain] : pain [Inflammation] : inflammation [X-ray evidence of Osteoarthritis on this or prior visit] : x-ray evidence of Osteoarthritis on this or prior visit [Alcohol] : alcohol [Betadine] : betadine [Ethyl Chloride sprayed topically] : ethyl chloride sprayed topically [Sterile technique used] : sterile technique used [Orthovisc] : Orthovisc [#4] : series #4 [Call if redness, pain or fever occur] : call if redness, pain or fever occur [Apply ice for 15min out of every hour for the next 12-24 hours as tolerated] : apply ice for 15 minutes out of every hour for the next 12-24 hours as tolerated [Patient was advised to rest the joint(s) for ____ days] : patient was advised to rest the joint(s) for [unfilled] days [Previous OTC use and PT nontherapeutic] : patient has tried OTC's including aspirin, Ibuprofen, Aleve, etc or prescription NSAIDS, and/or exercises at home and/or physical therapy without satisfactory response [Patient had decreased mobility in the joint] : patient had decreased mobility in the joint [Risks, benefits, alternatives discussed / Verbal consent obtained] : the risks benefits, and alternatives have been discussed, and verbal consent was obtained [de-identified] : NDC [address]-98 Exp: 2/29/24

## 2022-04-25 ENCOUNTER — APPOINTMENT (OUTPATIENT)
Dept: ORTHOPEDIC SURGERY | Facility: CLINIC | Age: 82
End: 2022-04-25

## 2022-06-07 ENCOUNTER — APPOINTMENT (OUTPATIENT)
Dept: ORTHOPEDIC SURGERY | Facility: CLINIC | Age: 82
End: 2022-06-07
Payer: MEDICARE

## 2022-06-07 PROCEDURE — L1833: CPT

## 2022-06-07 PROCEDURE — 99213 OFFICE O/P EST LOW 20 MIN: CPT | Mod: 25

## 2022-06-07 PROCEDURE — 20610 DRAIN/INJ JOINT/BURSA W/O US: CPT | Mod: 50

## 2022-06-07 PROCEDURE — J3490M: CUSTOM

## 2022-06-07 NOTE — REASON FOR VISIT
[FreeTextEntry2] : Follow up with Bl knee pain, pain is getting worse on his left knee more than rt knee

## 2022-06-07 NOTE — HISTORY OF PRESENT ILLNESS
[Left Leg] : left leg [Right Leg] : right leg [] : This patient has had an injection before: yes [Orthovisc] : Orthovisc [Result of repetitive motion] : result of repetitive motion [8] : 8 [4] : 4 [Dull/Aching] : dull/aching [FreeTextEntry1] : Knees [de-identified] : 04/7/22  knees [de-identified] : bl knees [TWNoteComboBox1] : 50% [de-identified] : Low back, L shoulder, B/L knee pain\par   3/3/22: MRI review. Shoulder with slight improvement.\par   3/17/22: Patient here today for orthovisc #1 both knees\par  3/24/22: Orthovisc #2\par  3/31/22: Orthovisc #3 bilateral knees.\par 4/7/22: Orthovisc #4 bilateral knees.

## 2022-06-07 NOTE — PHYSICAL EXAM
[Orientated] : orientated [Well Developed] : well developed [Right] : right knee [Left] : left knee [5___] : hamstring 5[unfilled]/5 [Positive] : positive Tono [] : negative Lachmann [TWNoteComboBox7] : flexion 110 degrees [de-identified] : extension 7 degrees

## 2022-06-07 NOTE — DISCUSSION/SUMMARY
[de-identified] : reviewed his xrays from feb both knees. Min OA.\par since he continues with buckling and falling complaints worse on the left, will get him in a hinged knee brace for ambulation and get an mri. If meniscal pathology seems to be the cause of his instability would consider arthroscopic intervention.\par \par will administer csi b/l today for pain

## 2022-06-07 NOTE — PROCEDURE
[Large Joint Injection] : Large joint injection [Bilateral] : bilaterally of the [Knee] : knee [Pain] : pain [Inflammation] : inflammation [X-ray evidence of Osteoarthritis on this or prior visit] : x-ray evidence of Osteoarthritis on this or prior visit [Alcohol] : alcohol [Betadine] : betadine [Ethyl Chloride sprayed topically] : ethyl chloride sprayed topically [Sterile technique used] : sterile technique used [___ cc    6mg] :  Betamethasone (Celestone) ~Vcc of 6mg [___ cc    1%] : Lidocaine ~Vcc of 1%  [] : Patient tolerated procedure well [Call if redness, pain or fever occur] : call if redness, pain or fever occur [Apply ice for 15min out of every hour for the next 12-24 hours as tolerated] : apply ice for 15 minutes out of every hour for the next 12-24 hours as tolerated [Patient was advised to rest the joint(s) for ____ days] : patient was advised to rest the joint(s) for [unfilled] days [Previous OTC use and PT nontherapeutic] : patient has tried OTC's including aspirin, Ibuprofen, Aleve, etc or prescription NSAIDS, and/or exercises at home and/or physical therapy without satisfactory response [Patient had decreased mobility in the joint] : patient had decreased mobility in the joint [Risks, benefits, alternatives discussed / Verbal consent obtained] : the risks benefits, and alternatives have been discussed, and verbal consent was obtained

## 2022-06-09 ENCOUNTER — FORM ENCOUNTER (OUTPATIENT)
Age: 82
End: 2022-06-09

## 2022-06-10 ENCOUNTER — APPOINTMENT (OUTPATIENT)
Dept: MRI IMAGING | Facility: CLINIC | Age: 82
End: 2022-06-10
Payer: MEDICARE

## 2022-06-10 PROCEDURE — 73721 MRI JNT OF LWR EXTRE W/O DYE: CPT | Mod: LT

## 2022-07-12 ENCOUNTER — APPOINTMENT (OUTPATIENT)
Dept: ORTHOPEDIC SURGERY | Facility: CLINIC | Age: 82
End: 2022-07-12

## 2022-07-12 DIAGNOSIS — M89.9 DISORDER OF BONE, UNSPECIFIED: ICD-10-CM

## 2022-07-12 DIAGNOSIS — M41.9 SCOLIOSIS, UNSPECIFIED: ICD-10-CM

## 2022-07-12 PROCEDURE — 99214 OFFICE O/P EST MOD 30 MIN: CPT

## 2022-07-12 NOTE — IMAGING
[de-identified] : Back, including spine: Inspection of the thoracic/lumbar spine is as follows: scoliosis Palpation of the\par thoracic/lumbar spine is as follows: left SI joint tenderness and right trochanteric bursa tenderness to\par palpation. Range of motion of the thoracic and lumbar spine is as follows: Diminished range of motion in all\par planes. pain at extremes extension, pain with bending to the right and pain with bending to the left. Strength\par Testing of the thoracic and lumbar spine is as follows: motor exam is 5/5 throughout both lower extremities with normal\par tone and motor exam is non-focal throughout both lower extremities Special testing of the thoracic and lumbar spine is\par as follows: negative sitting straight leg raise on right and negative sitting straight leg raise on left Neurological testing\par of the thoracic and lumbar spine is as follows: light touch is intact throughout both lower extremities Gait and function\par is as follows: patient ambulates without assistive device and antalgic gait.

## 2022-07-12 NOTE — ASSESSMENT
[FreeTextEntry1] : Lesion noted within L3 vertebral body with surrounding STIR signal\par MRI w/ and w/o contrast to better characterize lesion

## 2022-07-12 NOTE — HISTORY OF PRESENT ILLNESS
[10] : 10 [de-identified] : LHR scoli and lumbar XRs\par Impression: top of the T7 and the bottom of the T11, this angle measures 23 degrees. Measuring from top of the T12\par and the bottom of the L4, this angle measures 43 degrees. \par There is no spondylolisthesis. Compression fracture deformity of L5 and S1\par Ind. review- agree. 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 50% widgth of VB\par \par MRI [facility] 3/11/22 Report not available. \par Ind. review- NF stenosis L4/5 \par Severe central and NF stenosis L3/4\par NF stenosis L2/3\par Lesion noted within L3 VB with significant STIR signal \par \par 3/14/22: 80 y/o male presents for lower back pain X 5 years. Denies specific injury. Radiates to medial thighs. Denies\par N/T. Saw PCP 2/10/22 and was DXed with scoliosis on XR. MRI L spine done Friday. Aggravated by walking and sitting\par on hard surface. Was RXed gabapentin, which has been helping. Denies b/b incontinence. Denies prior back\par injuries/surgeries.\par 3/28/22- Taking carolee with some relief. Pain radiating down BLE less.\par 7/12/22- MRI f/u. No f/c, recent illnesses

## 2022-07-14 ENCOUNTER — APPOINTMENT (OUTPATIENT)
Dept: ORTHOPEDIC SURGERY | Facility: CLINIC | Age: 82
End: 2022-07-14

## 2022-07-14 DIAGNOSIS — M17.0 BILATERAL PRIMARY OSTEOARTHRITIS OF KNEE: ICD-10-CM

## 2022-07-14 PROCEDURE — 99213 OFFICE O/P EST LOW 20 MIN: CPT

## 2022-07-14 NOTE — ASSESSMENT
[FreeTextEntry1] : FINDINGS/IMPRESSION: L-SPINE XRAY LHR\par There is right lateral listhesis of L4 on L5 by 2.0 cm, unchanged on the neutral view and right bending view. \par Please see official report of the scoliosis series of 2/7/22 for additional findings. \par JOSH REDMOND CHI, MD - Electronically Signed: 02-\par I REVIEWED AND AGREE WITH THE RESULTS. \par \par IMPRESSION: SCOLIOSIS STUDY. \par 1. Levoscoliosis of the thoracic spine measures 23 degrees and dextroscoliosis of the lumbar spine 43 degrees.\par 2. Transitional lumbosacral vertebra partially lumbarized S1.\par 3. Compression fracture deformity of L5 and S1. \par 4. Multilevel disc space narrowing of the lumbar spine. \par Miguel A Amaral MD - Electronically Signed: 02-\par I REVIEWED AND AGREE WITH THE RESULTS\par \par 2/17/22 xray left shoulder reveals AC joint arthritic changes. . \par Xray of the right knee reveals mod/adv tricompartment OA\par xray of the left knee reveals mod/adv tricompartment OA \par Obtain mri LEFT SHOULDER r/o RCT. \par \par Obtain auth for gel injection for the treatment of OA B/L knees. \par Referred to spine specialist for further care and treatment.\par \par Impression: left shoulder\par 1. 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 Lateral downsloping acromion which may be causing impingement.\par 2. High-grade partial tear at the insertion and critical zone with severe tendinopathy and fraying of the supraspinatus tendon. Low-grade partial tear at the insertion with severe tendinopathy and fraying of the infraspinatus tendon.\par 3. Longitudinal interstitial tear, tendinopathy, and tenosynovitis of the long head of the biceps tendon. The intra-articular portion is not well-visualized, likely torn.\par 4. Thickening/tendinopathy of the subscapularis tendon.\par 5. Tear of the posterior, superior, and anterior labrum.\par 6. Small joint effusion.\par E-signed by David Kramer M.D. on 02/26/2022\par I reviewed and agree with the results.\par \par Pathology discussed, wear and tear type changes. not indicated for surgical intervention. MRI reveals global inflammation with partial tear, and AC joint OA. \par He reports mild improvement in the shoulder, still having back and knee pain, \par Gel injections still pending, and intends to see Dr. Felix in the following days\par \par 3/31/22: He tolerated the injections well. He is advised rest and ice today. Activities as tolerated tomorrow. f/u in 1 week to continue series.\par \par 4/7/22: Orthovisc #4 B/L knees today, tolerated well \par \par 6/7/22: \par reviewed his xrays from feb both knees. Min OA.\par since he continues with buckling and falling complaints worse on the left, will get him in a hinged knee brace for ambulation and get an mri. If meniscal pathology seems to be the cause of his instability would consider arthroscopic intervention.\par \par will administer csi b/l today for pain \par \par 7/14/22: due to continued pain L>R and failure with conservative treatment, TKA may be his next plan for treatment of OA for the left knee. c/o of localized numbness in the knee. May be related to the l-spine pathology. He is under the treatment of Dr. Felix. \par Referred to Dr. Galindo \par MRI reviewed. \par Questions answered. \par

## 2022-07-14 NOTE — DATA REVIEWED
[MRI] : MRI [Left] : left [Knee] : knee [Report was reviewed and noted in the chart] : The report was reviewed and noted in the chart [I reviewed the films/CD and agree] : I reviewed the films/CD and agree [FreeTextEntry1] : Impression: \par 1. Tricompartmental arthrosis with complex medial and lateral meniscal tearing with extrusion, chronic \par ligament sprains, extensor mechanism tendinopathy with quadriceps tendon bone spurs and mild effusion, \par synovitis and small popliteal cyst with slight subchondral edema and cysts in the lateral femoral trochlea.\par 2. No acute fracture.\par Date of Dictation 06/12/2022/Electronically signed by Hung Marcus MD 06/13/2022 8:06:05 AM

## 2022-07-14 NOTE — DISCUSSION/SUMMARY
[de-identified] : The documentation recorded by the scribe accurately reflects the service I personally performed and the decisions made by me.\par I, Clyde Best, attest that this documentation has been prepared under the direction and in the presence of Provider Yosvany Adams MD.\par The patient was seen by Dr. Adams\par

## 2022-07-14 NOTE — HISTORY OF PRESENT ILLNESS
[de-identified] : \par 6-7-22- Completed orthovisc series in April with minimal help. C/o b/l knee pain left >R with buckling and falling on the left. \par \par \par Low back, L shoulder, B/L knee pain\par \par 4/7/22: Orthovisc #4 bilateral knees.\par 3/31/22: Orthovisc #3 bilateral knees.\par 3/24/22: Orthovisc #2\par 3/17/22: Patient here today for orthovisc #1 both knees\par 3/3/22: MRI review. Shoulder with slight improvement.\par \par \par \par \par 2/17/22: [facility] patient here for lower back, L shoulder, and B/L knee pain. Low back pain started approx. 5 years ago and has been worsening since. radiates to B/L lower legs with N/T. Lumbar XR performed at [facility] 1 week ago, which revealed scoliosis. Back pain aggravated by lifting/walking, and prolonged sitting. Alleviated by lying down. Denies bowel/bladder incontinence. L lateral shoulder pain has been occurring X 4 years, aggravated by sleeping on L side. Denies radiation/N/T. Aggravated by reaching behind/overhead. Alleviated by massage. Diffuse B/L knee pain has been occurring for approx. 7 years. Aggravated by going up stairs. Alleviated by massage. Denies prior injuries/treatments/surgeries to back, L shoulder, or B/L knees.\par \par PMH: HTN. Denies DM, high cholesterol, or CA Hx.\par \par Occupation: Retired cook. [FreeTextEntry1] : B/L wilmer L >R

## 2022-07-14 NOTE — PHYSICAL EXAM
[Orientated] : orientated [Well Developed] : well developed [Right] : right knee [5___] : hamstring 5[unfilled]/5 [Positive] : positive Tono [Left] : left knee [] : negative Lachmann [TWNoteComboBox7] : flexion 110 degrees [de-identified] : extension 7 degrees

## 2022-07-25 ENCOUNTER — FORM ENCOUNTER (OUTPATIENT)
Age: 82
End: 2022-07-25

## 2022-07-26 ENCOUNTER — APPOINTMENT (OUTPATIENT)
Dept: MRI IMAGING | Facility: CLINIC | Age: 82
End: 2022-07-26

## 2022-07-26 PROCEDURE — 72158 MRI LUMBAR SPINE W/O & W/DYE: CPT

## 2022-08-11 ENCOUNTER — APPOINTMENT (OUTPATIENT)
Dept: ORTHOPEDIC SURGERY | Facility: CLINIC | Age: 82
End: 2022-08-11

## 2022-08-11 DIAGNOSIS — M17.12 UNILATERAL PRIMARY OSTEOARTHRITIS, LEFT KNEE: ICD-10-CM

## 2022-08-11 DIAGNOSIS — M17.11 UNILATERAL PRIMARY OSTEOARTHRITIS, RIGHT KNEE: ICD-10-CM

## 2022-08-11 PROCEDURE — 99214 OFFICE O/P EST MOD 30 MIN: CPT

## 2022-08-11 NOTE — PHYSICAL EXAM
[Orientated] : orientated [Well Developed] : well developed [Right] : right knee [5___] : hamstring 5[unfilled]/5 [Positive] : positive Tono [Left] : left knee [NL (140)] : flexion 140 degrees [NL (0)] : extension 0 degrees [] : no erythema [TWNoteComboBox7] : flexion 110 degrees [de-identified] : extension 7 degrees

## 2022-08-11 NOTE — DATA REVIEWED
[FreeTextEntry1] : LT KNEE MRI 6/10/22:\par 1. Tricompartmental arthrosis with complex medial and lateral meniscal tearing with extrusion, chronic \par ligament sprains, extensor mechanism tendinopathy with quadriceps tendon bone spurs and mild effusion, synovitis and small popliteal cyst with slight subchondral edema and cysts in the lateral femoral trochlea.\par 2. No acute fracture.

## 2022-08-11 NOTE — HISTORY OF PRESENT ILLNESS
[Left Leg] : left leg [Gradual] : gradual [Localized] : localized [Household chores] : household chores [Standing] : standing [Walking] : walking [Stairs] : stairs [10] : 10 [8] : 8 [Constant] : constant [Nothing helps with pain getting better] : Nothing helps with pain getting better [de-identified] : pt presents here today with left  knees pain for years\par pt has try orthovisc injections in the past by Dr Adams with no relief \par No relief with cortisone, HEP and meds. Pain progressing with diff ambulating\par \par PMHx HTN\par \par Denies DVT/PE, MRSA/VRSA, METAL ALLERGIES [] : no [FreeTextEntry1] : knees  [FreeTextEntry5] : no injury  [de-identified] : Dr Adams  [de-identified] :  xrays and mri of the left [facility] done [facility]  [de-identified] : orthovisc injections

## 2022-08-11 NOTE — ASSESSMENT
[FreeTextEntry1] : 81 year M WITH MODERATE LT KNEE PAIN. PAIN WORSENS WITH STAIRS AND WALKING PROLONGED DISTANCES. PAIN IS AFFECTING ADL AND FUNCTIONAL ACTIVITIES. MRI 6/10/22 REVIEWED. TREATMENT OPTIONS REVIEWED. LT TKA DISCUSSED AT LENGTH. HE WILL HOLD OFF FOR NOW AND WILL DISCUSS THIS WITH HIS FAMILY. HE WILL FOLLOW UP WITH A FAMILY MEMBER TO DISCUSS TKA FURTHER. \par \par PMHx: HTN, HLD\par NO METAL ALLERGIES\par NO H/O DM\par NO H/O DVT/PE

## 2022-08-16 ENCOUNTER — APPOINTMENT (OUTPATIENT)
Dept: ORTHOPEDIC SURGERY | Facility: CLINIC | Age: 82
End: 2022-08-16

## 2022-08-16 DIAGNOSIS — M70.70 OTHER BURSITIS OF HIP, UNSPECIFIED HIP: ICD-10-CM

## 2022-08-16 DIAGNOSIS — M51.9 UNSPECIFIED THORACIC, THORACOLUMBAR AND LUMBOSACRAL INTERVERTEBRAL DISC DISORDER: ICD-10-CM

## 2022-08-16 PROCEDURE — 99214 OFFICE O/P EST MOD 30 MIN: CPT

## 2022-08-16 NOTE — IMAGING
[de-identified] : Back, including spine: Inspection of the thoracic/lumbar spine is as follows: scoliosis Palpation of the\par thoracic/lumbar spine is as follows: left SI joint tenderness and Left trochanteric bursa tenderness to\par palpation. \par Range of motion of the thoracic and lumbar spine is as follows: Diminished range of motion in all\par planes. pain at extremes extension, pain with bending to the right and pain with bending to the left. Strength\par Testing of the thoracic and lumbar spine is as follows: motor exam is 5/5 throughout both lower extremities with normal\par tone and motor exam is non-focal throughout both lower extremities Special testing of the thoracic and lumbar spine is\par as follows: negative sitting straight leg raise on right and negative sitting straight leg raise on left Neurological testing\par of the thoracic and lumbar spine is as follows: light touch is intact throughout both lower extremities Gait and function\par is as follows: patient ambulates without assistive device and antalgic gait.

## 2022-08-16 NOTE — HISTORY OF PRESENT ILLNESS
[Lower back] : lower back [10] : 10 [de-identified] : LHR scoli and lumbar XRs\par Impression: top of the T7 and the bottom of the T11, this angle measures 23 degrees. Measuring from top of the T12\par and the bottom of the L4, this angle measures 43 degrees. \par There is no spondylolisthesis. Compression fracture deformity of L5 and S1\par Ind. review- agree. 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 50% widgth of VB\par \par MRI LINC 3/11/22 Report not available. \par Ind. review- NF stenosis L4/5 \par Severe central and NF stenosis L3/4\par NF stenosis L2/3\par Lesion noted within L3 VB with significant STIR signal \par \par L MRI 7/26/22\par MRI Impression: Severe endplate degeneration and endplate edema at L3. No osseous lesion. \par Multilevel central stenosis and nerve impingement at L4/5\par Ind. review- agree no discreate enhancing lesion seen in L3\par \par 3/14/22: 80 y/o male presents for lower back pain X 5 years. Denies specific injury. Radiates to medial thighs. Denies\par N/T. Saw PCP 2/10/22 and was DXed with scoliosis on XR. MRI L spine done Friday. Aggravated by walking and sitting\par on hard surface. Was RXed gabapentin, which has been helping. Denies b/b incontinence. Denies prior back\par injuries/surgeries.\par 3/28/22- Taking carolee with some relief. Pain radiating down BLE less.\par 7/12/22- MRI f/u. No f/c, recent illnesses\par \par 8/16/22: MRI f/u. Continues to have pain. \par  [de-identified] : MRI

## 2022-08-16 NOTE — ASSESSMENT
[FreeTextEntry1] : No discrete lesions noted on contrast MRI\par PT for troch bursitis\par Reports negative oncology blood w/u recently as well

## 2022-09-26 ENCOUNTER — APPOINTMENT (OUTPATIENT)
Dept: ORTHOPEDIC SURGERY | Facility: CLINIC | Age: 82
End: 2022-09-26

## 2023-02-23 NOTE — ED PROVIDER NOTE - CPE EDP SKIN NORM
normal... Low Dose Naltrexone Counseling- I discussed with the patient the potential risks and side effects of low dose naltrexone including but not limited to: more vivid dreams, headaches, nausea, vomiting, abdominal pain, fatigue, dizziness, and anxiety.

## 2023-05-27 NOTE — ED ADULT NURSE NOTE - AS SC BRADEN NUTRITION
Problem: Adult Inpatient Plan of Care  Goal: Plan of Care Review  Outcome: Ongoing, Progressing  Flowsheets  Taken 5/27/2023 0319 by Bekah Javier RN  Progress: improving  Outcome Evaluation: Radha slept well through the night. No complaints or concerns. Falls precautions in place. Will need to be re-evaluated by PT today.  Taken 5/26/2023 1332 by Prachi Pino PT  Plan of Care Reviewed With: patient   Goal Outcome Evaluation:           Progress: improving  Outcome Evaluation: Radha slept well through the night. No complaints or concerns. Falls precautions in place. Will need to be re-evaluated by PT today.          (4) excellent

## 2023-06-27 NOTE — ED PROVIDER NOTE - IV ALTEPLASE DOOR HIDDEN
"[facility]   Physical Therapy Treatment Note      Name: Obdulia Meek  Clinic Number: [id number]    RAJAN Wright  6/27/2023                     show

## 2023-10-03 NOTE — DISCHARGE NOTE PROVIDER - NSDCHC_MEDRECSTATUS_GEN_ALL_CORE
Admission Reconciliation is Completed  Discharge Reconciliation is Completed antalgic gait/no calf tenderness/decreased ROM due to pain/joint warmth/strength 5/5 bilateral upper extremities/strength 5/5 bilateral lower extremities/abnormal gait details…

## 2024-08-02 ENCOUNTER — APPOINTMENT (OUTPATIENT)
Dept: PAIN MANAGEMENT | Facility: CLINIC | Age: 84
End: 2024-08-02
Payer: MEDICARE

## 2024-08-02 VITALS — HEIGHT: 71 IN | BODY MASS INDEX: 25.76 KG/M2 | WEIGHT: 184 LBS

## 2024-08-02 DIAGNOSIS — M48.07 SPINAL STENOSIS, LUMBOSACRAL REGION: ICD-10-CM

## 2024-08-02 DIAGNOSIS — M54.50 LOW BACK PAIN, UNSPECIFIED: ICD-10-CM

## 2024-08-02 DIAGNOSIS — M51.9 UNSPECIFIED THORACIC, THORACOLUMBAR AND LUMBOSACRAL INTERVERTEBRAL DISC DISORDER: ICD-10-CM

## 2024-08-02 PROCEDURE — 99204 OFFICE O/P NEW MOD 45 MIN: CPT

## 2024-08-02 NOTE — HISTORY OF PRESENT ILLNESS
[Lower back] : lower back [10] : 10 [0] : 0 [Burning] : burning [Radiating] : radiating [Sharp] : sharp [Shooting] : shooting [Stabbing] : stabbing [Intermittent] : intermittent [Leisure] : leisure [Nothing helps with pain getting better] : Nothing helps with pain getting better [Walking] : walking [FreeTextEntry1] : Initial HPI 08/02/2024: Pain started a few months ago and is on the LEFT side of the lower back and radiates into the left buttock and down the left thigh and lower leg to the toes described as a sharp shooting burning pain with associated numbness and tingling. Patient is not interested in any injections at this time.  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

## 2024-08-02 NOTE — PHYSICAL EXAM
[de-identified] : Constitutional; Appears well, no apparent distress Ability to communicate: Normal  Respiratory: non-labored breathing Skin: No rash noted Head: Normocephalic, atraumatic Neck: no visible thyroid enlargement Eyes: Extraocular movements intact Neurologic: Alert and oriented x3 Psychiatric: normal mood, affect and behavior [] : light touch intact throughout both lower extremities

## 2024-08-02 NOTE — DISCUSSION/SUMMARY
[de-identified] : After discussing various treatment options with the patient including but not limited to oral medications, physical therapy, exercise modalities as well as interventional spinal injections, we have decided with the following plan:  - Continue home exercises, stretching, activity modification, physical therapy, and conservative care. - MRI report and/or images was reviewed and discussed with the patient. - Follow-up as needed. - Will provide prescription for Physical Therapy. - Recommend increased Gabapentin to 200mg BID. Pt instructed not to drive or operate heavy machinery while on medications.